# Patient Record
Sex: MALE | Race: OTHER | URBAN - METROPOLITAN AREA
[De-identification: names, ages, dates, MRNs, and addresses within clinical notes are randomized per-mention and may not be internally consistent; named-entity substitution may affect disease eponyms.]

---

## 2023-10-17 ENCOUNTER — OFFICE VISIT (OUTPATIENT)
Dept: SURGERY | Facility: CLINIC | Age: 33
End: 2023-10-17

## 2023-10-17 DIAGNOSIS — B37.9 YEAST INFECTION: Primary | ICD-10-CM

## 2023-10-17 DIAGNOSIS — R82.90 URINE FINDING: ICD-10-CM

## 2023-10-17 LAB
APPEARANCE: CLEAR
BILIRUBIN: NEGATIVE
GLUCOSE (URINE DIPSTICK): NEGATIVE MG/DL
KETONES (URINE DIPSTICK): NEGATIVE MG/DL
LEUKOCYTES: NEGATIVE
MULTISTIX LOT#: NORMAL NUMERIC
NITRITE, URINE: NEGATIVE
OCCULT BLOOD: NEGATIVE
PH, URINE: 6 (ref 4.5–8)
PROTEIN (URINE DIPSTICK): NEGATIVE MG/DL
SPECIFIC GRAVITY: 1.02 (ref 1–1.03)
URINE-COLOR: YELLOW
UROBILINOGEN,SEMI-QN: 0.2 MG/DL (ref 0–1.9)

## 2023-10-17 PROCEDURE — 99214 OFFICE O/P EST MOD 30 MIN: CPT

## 2023-10-17 PROCEDURE — 81003 URINALYSIS AUTO W/O SCOPE: CPT

## 2023-10-17 RX ORDER — FLUCONAZOLE 150 MG/1
150 TABLET ORAL
Qty: 3 TABLET | Refills: 0 | Status: SHIPPED | OUTPATIENT
Start: 2023-10-17

## 2023-10-17 NOTE — PROGRESS NOTES
[facility], [address], [address] [facility]    Urology Consult Note    History of Present Illness:   Patient is a(n) 29year old male without significant pmhx presents for penile discharge consult. Pt had unprotected intercourse 3-4mos ago and began noticing white discharge around corona of glans. No urinary sx of frequency, urgency, dysuria, or gross hematuria. Also no urethral discharge noted. Was STD tested at [address] ago for G/C, HIV, and syph. Initially treated with fluconazole 150mg. Had some penile itchiness that got better with fluconazole but sx did not fully resolve. Only prescribed 1 pill. Discharge began to worsen shortly after so was treated with 5 day course of metronidazole for presumed trich as this was the only thing not tested. Discharge has since worsened and now developed strong odor. HISTORY:  No past medical history on file. No past surgical history on file. No family history on file. Social History:   Social History     Socioeconomic History    Marital status:         Allergies  No Known Allergies    Review of Systems:   A 10-point review of systems was completed and is negative other than as noted above. Physical Exam:   There were no vitals taken for this visit.     GENERAL APPEARANCE: no acute distress  NEUROLOGIC: converses appropriately  HEAD: atraumatic, normocephalic  LUNGS: non-labored breathing  ABDOMEN: soft, nontender, non-distended  BACK: no CVA tenderness  PSYCH: appropriate affect and mood  INGUINAL CANALS: no hernias  PENILE MEATUS: open and in normal location  PENIS: scant white discharge around ventral aspect of penile head, no erythema or bleeding noted upon scraping  SCROTUM: normal, no varicocele  TESTES: normal anatomy  EPIDIDYMIS: normal anatomy      Results:     Laboratory Data:  No results found for: \"WBC\", \"HGB\", \"PLT\"  No results found for: \"NA\", \"K\", \"CL\", \"CO2\", \"BUN\", \"CREATININE\", \"GLU\", \"GFRAA\", \"AST\", \"ALT\", \"TP\", \"ALB\", \"PHOS\", \"CA\", \"MG\"    Urinalysis Results (last 3 years):  Recent Labs     10/17/23  1425   SPECGRAVITY 1.020   PHURINE 6.0   NITRITE Negative       Urine Culture Results (last 3 years):  No results found for: \"URINECUL\"    Imaging  No results found. Impression:   Recommendations:  Yeast infection  - fluconazole 150mg x 9 days  - vikor STI/UTI PCR to r/o other infectious etiology    A total of 30 minutes were spent on the visit including chart review in preparation for the visit, time with the patient, placing orders and communication with other providers where appropriate. Thank you very much for this consult. Please call if there are any questions or concerns.      Juan R Gutierrez PA-C  Urology  [facility]  Phone: [phone]    Date: 10/17/2023  Time: 2:57 PM

## 2023-10-19 ENCOUNTER — TELEPHONE (OUTPATIENT)
Dept: SURGERY | Facility: CLINIC | Age: 33
End: 2023-10-19

## 2023-10-19 NOTE — TELEPHONE ENCOUNTER
Ureaplasma 100 copies. Not clinically significant. Continue fluconazole 9 day course. Can f/u if still having problems after. All questions answered.

## 2023-11-16 ENCOUNTER — TELEPHONE (OUTPATIENT)
Dept: SURGERY | Facility: CLINIC | Age: 33
End: 2023-11-16

## 2023-11-16 RX ORDER — FLUCONAZOLE 150 MG/1
150 TABLET ORAL
Qty: 3 TABLET | Refills: 0 | Status: SHIPPED | OUTPATIENT
Start: 2023-11-16

## 2023-11-16 NOTE — TELEPHONE ENCOUNTER
Per pt never got around to taking fluconazole medication, states [place] medication with him, states he lost the medication, asking for new prescription to be sent to [facility] in [place]. Please advise thank you. Asking to leave  once prescription sent.

## 2023-11-17 NOTE — TELEPHONE ENCOUNTER
Pt called to check on the new rx. Fluconazole. Pt called [facility] but they have no record of the rx. Please send.   Pt advised office closes at 4:00 pm on Friday and will return Monday 11-20-23 at 8:00 am. Call pt

## 2023-11-20 RX ORDER — FLUCONAZOLE 150 MG/1
150 TABLET ORAL
Qty: 3 TABLET | Refills: 0 | Status: SHIPPED | OUTPATIENT
Start: 2023-11-20

## 2024-08-23 ENCOUNTER — OFFICE VISIT (OUTPATIENT)
Dept: SURGERY | Facility: CLINIC | Age: 34
End: 2024-08-23

## 2024-08-23 DIAGNOSIS — B37.9 YEAST INFECTION: Primary | ICD-10-CM

## 2024-08-23 DIAGNOSIS — R82.90 URINE FINDING: ICD-10-CM

## 2024-08-23 LAB
APPEARANCE: CLEAR
BILIRUBIN: NEGATIVE
GLUCOSE (URINE DIPSTICK): NEGATIVE MG/DL
LEUKOCYTES: NEGATIVE
MULTISTIX LOT#: NORMAL NUMERIC
NITRITE, URINE: NEGATIVE
OCCULT BLOOD: NEGATIVE
PH, URINE: 6 (ref 4.5–8)
PROTEIN (URINE DIPSTICK): NEGATIVE MG/DL
SPECIFIC GRAVITY: 1.03 (ref 1–1.03)
URINE-COLOR: YELLOW
UROBILINOGEN,SEMI-QN: 0.2 MG/DL (ref 0–1.9)

## 2024-08-23 PROCEDURE — 99213 OFFICE O/P EST LOW 20 MIN: CPT

## 2024-08-23 PROCEDURE — 81003 URINALYSIS AUTO W/O SCOPE: CPT

## 2024-08-23 RX ORDER — FLUCONAZOLE 150 MG/1
150 TABLET ORAL
Qty: 3 TABLET | Refills: 0 | Status: SHIPPED | OUTPATIENT
Start: 2024-08-23

## 2024-08-23 NOTE — PROGRESS NOTES
[facility], [facility]    Urology Consult Note    History of Present Illness:   Patient is a(n) 34 year old male with hx of yeast infxn who presents for follow up.    I last saw pt on 10/17/23 for penile glans discharge. We treated with 3tabs of fluconazole 150mg after he improved with single tab but had persisting sx.     Sx largely improved after 3 tabs of fluconazole but did not completely resolve. Still with lingering discharge. Recently worsening again. Having less discharge around glans than before but very strong odor again.     No urinary complaints but feels erections are being affected, which occurred last time sx worsened.       HISTORY:  No past medical history on file.   No past surgical history on file.   No family history on file.   Social History:   Social History     Socioeconomic History    Marital status:         Allergies  No Known Allergies    Review of Systems:   A 10-point review of systems was completed and is negative other than as noted above.    Physical Exam:   There were no vitals taken for this visit.    GENERAL APPEARANCE: no acute distress  NEUROLOGIC: converses appropriately  HEAD: atraumatic, normocephalic  LUNGS: non-labored breathing  ABDOMEN: soft, nontender, non-distended  BACK: no CVA tenderness  PSYCH: appropriate affect and mood  INGUINAL CANALS: no hernias  PENILE MEATUS: open and in normal location  PENIS:  scant white discharge around ventral aspect of penile head, no erythema or bleeding noted upon scraping   SCROTUM: normal, no varicocele  TESTES: normal anatomy  EPIDIDYMIS: normal anatomy      Results:     Laboratory Data:  No results found for: \"WBC\", \"HGB\", \"PLT\"  No results found for: \"NA\", \"K\", \"CL\", \"CO2\", \"BUN\", \"CREATININE\", \"GLU\", \"GFRAA\", \"AST\", \"ALT\", \"TP\", \"ALB\", \"PHOS\", \"CA\", \"MG\"    Urinalysis Results (last 3 years):  Recent Labs     10/17/23  1425 08/23/24  1255   SPECGRAVITY 1.020 1.030   PHURINE 6.0 6.0    NITRITE Negative Negative       Urine Culture Results (last 3 years):  No results found for: \"URINECUL\"    Imaging  No results found.      Impression:   Recommendations:  Yeast infxn  - will send penile swab for culture  - begin fluconazole 150mg 3 tabs again; will need to notify if not completely resolved this time  - A1C pending  - may consider derm appt in the meantime    Thank you very much for this consult. Please call if there are any questions or concerns.     Jessica Alexis PA-C  Urology  [facility]  Phone: [phone]    Date: 8/23/2024  Time: 1:33 PM

## 2024-08-26 ENCOUNTER — TELEPHONE (OUTPATIENT)
Dept: SURGERY | Facility: CLINIC | Age: 34
End: 2024-08-26